# Patient Record
Sex: FEMALE | Race: WHITE | NOT HISPANIC OR LATINO | Employment: OTHER | URBAN - METROPOLITAN AREA
[De-identification: names, ages, dates, MRNs, and addresses within clinical notes are randomized per-mention and may not be internally consistent; named-entity substitution may affect disease eponyms.]

---

## 2022-09-25 ENCOUNTER — HOSPITAL ENCOUNTER (OUTPATIENT)
Dept: TELEMEDICINE | Facility: HOSPITAL | Age: 77
Discharge: HOME | End: 2022-09-25
Payer: MEDICARE

## 2022-09-25 DIAGNOSIS — I63.412 EMBOLIC STROKE INVOLVING LEFT MIDDLE CEREBRAL ARTERY: ICD-10-CM

## 2022-09-25 PROCEDURE — G0508 PR CRITICAL CARE TELEHLTH INITIAL CONSULT 60MIN: ICD-10-PCS | Mod: 95,GW,, | Performed by: PSYCHIATRY & NEUROLOGY

## 2022-09-25 PROCEDURE — G0508 CRIT CARE TELEHEA CONSULT 60: HCPCS | Mod: 95,GW,, | Performed by: PSYCHIATRY & NEUROLOGY

## 2022-09-25 NOTE — ASSESSMENT & PLAN NOTE
77-year-old female with aphasia and mild right-sided weakness.    Symptoms concerning for embolic stroke to the left middle cerebral artery territory.    CTA and CT angiogram are pending.      If there are no findings of blood products or other contraindications, recommend intravenous alteplase.  We will evaluate CTA for any other treatment options.

## 2022-09-25 NOTE — SUBJECTIVE & OBJECTIVE
Woke up with symptoms?: no    Recent bleeding noted: no  Does the patient take any Blood Thinners? no  Medications: No relevant medications      Past Medical History: Cancer    Past Surgical History: none    Family History: no relevant history    Social History: no smoking, no drinking, no drugs    Allergies: Allergies have not been reviewed     Review of Systems   Unable to perform ROS: Acuity of condition     Objective:   Vitals: There were no vitals taken for this visit.     CT READ: No    Physical Exam  Constitutional:       Appearance: She is well-developed and well-nourished.   HENT:      Head: Normocephalic.   Eyes:      Extraocular Movements: EOM normal.      Pupils: Pupils are equal, round, and reactive to light.   Cardiovascular:      Rate and Rhythm: Normal rate and regular rhythm.   Pulmonary:      Breath sounds: Normal breath sounds.   Abdominal:      General: Bowel sounds are normal.      Palpations: Abdomen is soft.   Musculoskeletal:         General: Normal range of motion.      Cervical back: Normal range of motion.   Skin:     Findings: No rash.   Psychiatric:         Mood and Affect: Mood and affect normal.

## 2022-09-25 NOTE — CONSULTS
[facility]  Vascular Neurology  Comprehensive Stroke Center  Tele[facility]ascular Neurology Acute Consultation Note      Consults    Consulting Provider: SKYLAR CABRERA  Current Providers  No providers found    Patient Location: [facility] Emergency Department  Spoke hospital nurse at bedside with patient assisting consultant.     Patient information was obtained from patient.         Assessment/Plan:       Diagnoses:   * Embolic stroke involving left middle cerebral artery  77-year-old female with aphasia and mild right-sided weakness.    Symptoms concerning for embolic stroke to the left middle cerebral artery territory.    CTA and CT angiogram are pending.      If there are no findings of blood products or other contraindications, recommend intravenous alteplase.  We will evaluate CTA for any other treatment options.        STROKE DOCUMENTATION     Acute Stroke Times:   Acute Stroke Times   Stroke Team Called Time: 1159  Stroke Team Arrival Time: 1159  CT not completed at time of evaluation - please review at spoke and document time: 1204    NIH Scale:  1a. Level of Consciousness: 0-->Alert, keenly responsive  1b. LOC Questions: 2-->Answers neither question correctly  1c. LOC Commands: 0-->Performs both tasks correctly  2. Best Gaze: 0-->Normal  3. Visual: 0-->No visual loss  4. Facial Palsy: 0-->Normal symmetrical movements  5a. Motor Arm, Left: 0-->No drift, limb holds 90 (or 45) degrees for full 10 secs  5b. Motor Arm, Right: 1-->Drift, limb holds 90 (or 45) degrees, but drifts down before full 10 secs, does not hit bed or other support  6a. Motor Leg, Left: 0-->No drift, leg holds 30 degree position for full 5 secs  6b. Motor Leg, Right: 0-->No drift, leg holds 30 degree position for full 5 secs  7. Limb Ataxia: 0-->Absent  8. Sensory: 0-->Normal, no sensory loss  9. Best Language: 3-->Mute, global aphasia, no usable speech  or auditory comprehension  10. Dysarthria: 0-->Normal  11. Extinction and Inattention (formerly Neglect): 0-->No abnormality  Total (NIH Stroke Scale): 6     Modified White    Sutton Coma Scale:    ABCD2 Score:    [id number] Score:   HAS -BLED Score:   ICH Score:   Hunt & Grajeda Classification:       There were no vitals taken for this visit.  Alteplase Eligible?: Yes  Alteplase Recommendation: Waiting on CT  Possible Interventional Revascularization Candidate? Awaiting CTA results from Spoke for determination     Disposition Recommendation: pending further studies    Subjective:     History of Present Illness:  77-year-old female with sudden onset aphasia.  No reported other symptoms, but noted to have mild weakness on the right side on exam.  Patient stopped taking anticoagulants recently.  Has not had stroke in the past.  There were no clear triggers.  Symptoms have not been treated.  Symptoms have not improved.      Woke up with symptoms?: no    Recent bleeding noted: no  Does the patient take any Blood Thinners? no  Medications: No relevant medications      Past Medical History: Cancer    Past Surgical History: none    Family History: no relevant history    Social History: no smoking, no drinking, no drugs    Allergies: Allergies have not been reviewed     Review of Systems   Unable to perform ROS: Acuity of condition     Objective:   Vitals: There were no vitals taken for this visit.     CT READ: No    Physical Exam  Constitutional:       Appearance: She is well-developed and well-nourished.   HENT:      Head: Normocephalic.   Eyes:      Extraocular Movements: EOM normal.      Pupils: Pupils are equal, round, and reactive to light.   Cardiovascular:      Rate and Rhythm: Normal rate and regular rhythm.   Pulmonary:      Breath sounds: Normal breath sounds.   Abdominal:      General: Bowel sounds are normal.      Palpations: Abdomen is soft.   Musculoskeletal:         General: Normal range of motion.       Cervical back: Normal range of motion.   Skin:     Findings: No rash.   Psychiatric:         Mood and Affect: Mood and affect normal.             Recommended the emergency room physician to have a brief discussion with the patient and/or family if available regarding the  risks and benefits of treatment, and to briefly document the occurrence of that discussion in his clinical encounter note.     The attending portion of this evaluation, treatment, and documentation was performed per Carmine Wray MD via audiovisual.    Billing code:  (time dependent stroke, complex case, unstable patient, hemorrhages, any intervention, some mimics)    This patient has a very critical neurological condition/illness, with very high morbidity and mortality.  There is a very high probability for acute neurological change leading to clinical and possibly life-threatening deterioration requiring highest level of physician preparedness for urgent intervention.  There is possibility that this condition will require treatment with high risk medications as quickly as possible.  There is also a possibility that the patient may benefit from further, more advance complex therapies (e.g. endovascular therapy) that will require prompt diagnosis and care.  Care was coordinated with other physicians involved in the patient's care.  Radiologic studies and laboratory data were reviewed and interpreted, and plan of care was re-assessed based on the results.  Diagnosis, treatment options and prognosis may have been discussed with the patient and/or family members or caregiver.  Further advanced medical management and further evaluation is warranted for his care.    In your opinion, this was a: Tier 1 Van Positive    Consult End Time: 12:06 PM     Carmine Wray MD  [facility]  Vascular Neurology   [facility]

## 2022-12-26 PROBLEM — I63.412 EMBOLIC STROKE INVOLVING LEFT MIDDLE CEREBRAL ARTERY: Status: RESOLVED | Noted: 2022-09-25 | Resolved: 2022-12-26
